# Patient Record
Sex: MALE | Race: ASIAN | Employment: FULL TIME | URBAN - METROPOLITAN AREA
[De-identification: names, ages, dates, MRNs, and addresses within clinical notes are randomized per-mention and may not be internally consistent; named-entity substitution may affect disease eponyms.]

---

## 2017-05-22 ENCOUNTER — TELEPHONE (OUTPATIENT)
Dept: INTERNAL MEDICINE CLINIC | Facility: CLINIC | Age: 37
End: 2017-05-22

## 2018-03-15 ENCOUNTER — MED REC SCAN ONLY (OUTPATIENT)
Dept: INTERNAL MEDICINE CLINIC | Facility: CLINIC | Age: 38
End: 2018-03-15

## 2019-03-13 ENCOUNTER — OFFICE VISIT (OUTPATIENT)
Dept: INTERNAL MEDICINE CLINIC | Facility: CLINIC | Age: 39
End: 2019-03-13
Payer: COMMERCIAL

## 2019-03-13 VITALS
SYSTOLIC BLOOD PRESSURE: 110 MMHG | WEIGHT: 174 LBS | DIASTOLIC BLOOD PRESSURE: 80 MMHG | HEIGHT: 68.75 IN | BODY MASS INDEX: 25.77 KG/M2 | TEMPERATURE: 98 F | HEART RATE: 80 BPM | RESPIRATION RATE: 16 BRPM

## 2019-03-13 DIAGNOSIS — J01.00 ACUTE MAXILLARY SINUSITIS, RECURRENCE NOT SPECIFIED: Primary | ICD-10-CM

## 2019-03-13 DIAGNOSIS — R42 DIZZINESS: ICD-10-CM

## 2019-03-13 PROCEDURE — 99203 OFFICE O/P NEW LOW 30 MIN: CPT | Performed by: FAMILY MEDICINE

## 2019-03-13 RX ORDER — AMOXICILLIN AND CLAVULANATE POTASSIUM 875; 125 MG/1; MG/1
1 TABLET, FILM COATED ORAL 2 TIMES DAILY
Qty: 20 TABLET | Refills: 0 | Status: SHIPPED | OUTPATIENT
Start: 2019-03-13 | End: 2019-03-23

## 2019-03-13 RX ORDER — FLUTICASONE PROPIONATE 50 MCG
2 SPRAY, SUSPENSION (ML) NASAL DAILY
Qty: 1 BOTTLE | Refills: 1 | Status: SHIPPED | OUTPATIENT
Start: 2019-03-13 | End: 2020-03-07

## 2019-03-13 NOTE — PROGRESS NOTES
CHIEF COMPLAINT:   Patient presents with:  Sinusitis  Cough  Dizziness      HPI:   Gil Ledbetter is a 35year old male who presents for upper respiratory symptoms for  end of last week.  Patient reports sore throat, congestion, cough with cloudy  colored sputum, lymphadenopathy. ASSESSMENT AND PLAN:   Alex Morin is a 35year old male who presents with   Acute maxillary sinusitis, recurrence not specified  (primary encounter diagnosis)  Dizziness    No orders of the defined types were placed in this encounter.

## 2021-07-23 ENCOUNTER — OFFICE VISIT (OUTPATIENT)
Dept: INTERNAL MEDICINE CLINIC | Facility: CLINIC | Age: 41
End: 2021-07-23
Payer: COMMERCIAL

## 2021-07-23 VITALS
RESPIRATION RATE: 14 BRPM | HEART RATE: 74 BPM | DIASTOLIC BLOOD PRESSURE: 86 MMHG | TEMPERATURE: 98 F | SYSTOLIC BLOOD PRESSURE: 120 MMHG | HEIGHT: 68 IN | WEIGHT: 169 LBS | BODY MASS INDEX: 25.61 KG/M2

## 2021-07-23 DIAGNOSIS — B02.9 HERPES ZOSTER WITHOUT COMPLICATION: Primary | ICD-10-CM

## 2021-07-23 PROCEDURE — 3079F DIAST BP 80-89 MM HG: CPT | Performed by: FAMILY MEDICINE

## 2021-07-23 PROCEDURE — 3008F BODY MASS INDEX DOCD: CPT | Performed by: FAMILY MEDICINE

## 2021-07-23 PROCEDURE — 99213 OFFICE O/P EST LOW 20 MIN: CPT | Performed by: FAMILY MEDICINE

## 2021-07-23 PROCEDURE — 3074F SYST BP LT 130 MM HG: CPT | Performed by: FAMILY MEDICINE

## 2021-07-23 RX ORDER — VALACYCLOVIR HYDROCHLORIDE 1 G/1
1 TABLET, FILM COATED ORAL 3 TIMES DAILY
Qty: 21 TABLET | Refills: 0 | Status: SHIPPED | OUTPATIENT
Start: 2021-07-23 | End: 2021-07-30

## 2021-07-23 NOTE — PROGRESS NOTES
Ileana Mason is a 42year old male.   HPI:   In the last 3 d has noted a sensation in the rectum like it was leaking but nothing was   Then had a BM and felt that the R side of rectum like is was stretching and cracking   No blood    Is tender to wipe   No lumps

## 2021-07-30 ENCOUNTER — TELEPHONE (OUTPATIENT)
Dept: INTERNAL MEDICINE CLINIC | Facility: CLINIC | Age: 41
End: 2021-07-30

## 2021-07-30 RX ORDER — GABAPENTIN 100 MG/1
100 CAPSULE ORAL 3 TIMES DAILY
Qty: 90 CAPSULE | Refills: 0 | Status: SHIPPED | OUTPATIENT
Start: 2021-07-30

## 2021-07-30 NOTE — TELEPHONE ENCOUNTER
Spoke with patient, if he is still having some soreness can use Gabapentin, patient agreeable, rx sent, he will keep us updated. Patient verbalized understanding and agreeable to POC.

## 2021-07-30 NOTE — TELEPHONE ENCOUNTER
Pt wanted to update Dr Neli Moya on shingles -     Pt stated he is doing better and believes medication is working. Pt still has severe rash and blistering but it is easier to have a bowel movement. Still uncomfortable to sit at times.  Pt stated that he is exp

## 2021-07-30 NOTE — TELEPHONE ENCOUNTER
Glad its better   if still some soreness we can use gabapentin 100mg TID #90 to see if helps his s/s but should get better on its own

## 2021-10-15 ENCOUNTER — LAB ENCOUNTER (OUTPATIENT)
Dept: LAB | Age: 41
End: 2021-10-15
Attending: FAMILY MEDICINE
Payer: COMMERCIAL

## 2021-10-15 ENCOUNTER — TELEMEDICINE (OUTPATIENT)
Dept: INTERNAL MEDICINE CLINIC | Facility: CLINIC | Age: 41
End: 2021-10-15

## 2021-10-15 DIAGNOSIS — J06.9 URI, ACUTE: ICD-10-CM

## 2021-10-15 DIAGNOSIS — J06.9 URI, ACUTE: Primary | ICD-10-CM

## 2021-10-15 PROCEDURE — 99213 OFFICE O/P EST LOW 20 MIN: CPT | Performed by: FAMILY MEDICINE

## 2021-10-15 NOTE — PROGRESS NOTES
Martina Mckeon is a 42year old male.   HPI:   Patient has agreed to do a video encounter w me today in lieu of a regular appointment in regards to his URI s/s     Started 2 d ago w runny nose  , congestion   Slight tickle throat    occ cough yesterday   Smell and

## 2022-08-12 ENCOUNTER — TELEPHONE (OUTPATIENT)
Dept: INTERNAL MEDICINE CLINIC | Facility: CLINIC | Age: 42
End: 2022-08-12

## 2022-08-12 DIAGNOSIS — U07.1 COVID-19: Primary | ICD-10-CM

## 2022-08-12 NOTE — TELEPHONE ENCOUNTER
Please advise. Thank you! Spoke to patient, symptoms started Monday chills/fever     -abdominal cramps, discomfort, hot flashes, fatigue, headaches and has been having a hard time sleeping due to all of this    Wednesday felt like he was being kicked in the stomach and heaviness. Covid positive.     Some nausea, indigestion    Denies: bloating, blood in stool/urine, new food

## 2022-08-12 NOTE — TELEPHONE ENCOUNTER
Pt stated he has been experiencing abdominal cramps, discomfort, hot flashes, fatigue, headaches and has been having a hard time sleeping due to all of this. Pt stated he took a at home covid test today and it was positive, but he does not believe the symptoms are related to covid. Pt believes he may have injured himself somehow and might be bleeding internally. Pt stated symptoms began on Monday and they come and go. Pt would like to speak to nurse for further evaluation and recommendations. Please advise.

## 2022-08-12 NOTE — TELEPHONE ENCOUNTER
Patient would like PCR testing for confirmation of home test. Order pended. Relayed to patient. Advised to monitor for symptoms of blood in stool, urine. Severe abdominal pain and spike in temperature. Patient verbalized understanding.

## 2023-03-27 ENCOUNTER — OFFICE VISIT (OUTPATIENT)
Facility: LOCATION | Age: 43
End: 2023-03-27
Payer: COMMERCIAL

## 2023-03-27 DIAGNOSIS — S00.431A HEMATOMA OF RIGHT PINNA, INITIAL ENCOUNTER: Primary | ICD-10-CM

## 2023-03-27 RX ORDER — CEPHALEXIN 500 MG/1
500 CAPSULE ORAL 3 TIMES DAILY
Qty: 30 CAPSULE | Refills: 0 | Status: SHIPPED | OUTPATIENT
Start: 2023-03-27 | End: 2023-04-06

## 2023-03-28 ENCOUNTER — TELEPHONE (OUTPATIENT)
Facility: LOCATION | Age: 43
End: 2023-03-28

## 2023-03-28 ENCOUNTER — OFFICE VISIT (OUTPATIENT)
Facility: LOCATION | Age: 43
End: 2023-03-28
Payer: COMMERCIAL

## 2023-03-28 DIAGNOSIS — S00.431A HEMATOMA OF RIGHT PINNA, INITIAL ENCOUNTER: Primary | ICD-10-CM

## 2023-03-28 PROCEDURE — 99213 OFFICE O/P EST LOW 20 MIN: CPT | Performed by: OTOLARYNGOLOGY

## 2023-03-28 NOTE — TELEPHONE ENCOUNTER
Patient stating ear is swelling and building up again in area that is not compressed; concerned if he has to get that area drained as well. Requesting a callback.

## 2023-04-10 ENCOUNTER — OFFICE VISIT (OUTPATIENT)
Facility: LOCATION | Age: 43
End: 2023-04-10
Payer: COMMERCIAL

## 2023-04-10 DIAGNOSIS — S00.431A HEMATOMA OF RIGHT PINNA, INITIAL ENCOUNTER: Primary | ICD-10-CM

## 2023-04-10 PROCEDURE — 99024 POSTOP FOLLOW-UP VISIT: CPT | Performed by: OTOLARYNGOLOGY

## 2023-04-10 NOTE — PROGRESS NOTES
Patient returned from [facility]. While there he remove the anterior and posterior cotton packs. This is due to their appearance. Physical exam: The area in the region of the superior helical rim does show hard cartilage. There does not appear to be any fluctuance. No hematoma was evident to drain. Although not perfect at some acceptable results. If further swelling would develop he will return.